# Patient Record
Sex: MALE | Race: WHITE | NOT HISPANIC OR LATINO | Employment: OTHER | URBAN - METROPOLITAN AREA
[De-identification: names, ages, dates, MRNs, and addresses within clinical notes are randomized per-mention and may not be internally consistent; named-entity substitution may affect disease eponyms.]

---

## 2018-10-26 PROBLEM — Z00.00 ENCOUNTER FOR WELLNESS EXAMINATION: Status: ACTIVE | Noted: 2018-10-26

## 2018-10-26 PROBLEM — R59.1 LYMPHADENOPATHY OF HEAD AND NECK: Status: ACTIVE | Noted: 2018-10-26

## 2019-01-28 PROBLEM — Z00.00 ENCOUNTER FOR WELLNESS EXAMINATION: Status: RESOLVED | Noted: 2018-10-26 | Resolved: 2019-01-28

## 2019-02-19 PROBLEM — C76.0 MALIGNANT NEOPLASM OF HEAD, NECK AND FACE: Status: ACTIVE | Noted: 2019-02-19

## 2019-02-20 PROBLEM — C32.0: Status: ACTIVE | Noted: 2019-02-19

## 2019-02-28 PROBLEM — Z71.9 ENCOUNTER FOR EDUCATION: Status: ACTIVE | Noted: 2019-02-28

## 2019-04-20 PROBLEM — D61.818 PANCYTOPENIA: Status: ACTIVE | Noted: 2019-04-20

## 2019-04-20 PROBLEM — R79.89 ELEVATED LFTS: Status: ACTIVE | Noted: 2019-04-20

## 2019-04-20 PROBLEM — A41.9 SEPSIS: Status: ACTIVE | Noted: 2019-04-20

## 2019-04-20 PROBLEM — E87.1 HYPONATREMIA: Status: ACTIVE | Noted: 2019-04-20

## 2019-04-22 PROBLEM — E87.1 HYPONATREMIA: Status: RESOLVED | Noted: 2019-04-20 | Resolved: 2019-04-22

## 2019-04-22 PROBLEM — A41.9 SEPSIS: Status: RESOLVED | Noted: 2019-04-20 | Resolved: 2019-04-22

## 2019-04-22 PROBLEM — R79.89 ELEVATED LFTS: Status: RESOLVED | Noted: 2019-04-20 | Resolved: 2019-04-22

## 2019-04-23 PROBLEM — J18.9 PNEUMONIA: Status: ACTIVE | Noted: 2019-04-23

## 2019-05-11 PROBLEM — R74.01 TRANSAMINITIS: Status: ACTIVE | Noted: 2019-05-11

## 2019-05-11 PROBLEM — N17.9 AKI (ACUTE KIDNEY INJURY): Status: ACTIVE | Noted: 2019-05-11

## 2019-05-11 PROBLEM — R11.2 INTRACTABLE VOMITING WITH NAUSEA: Status: ACTIVE | Noted: 2019-05-11

## 2019-05-11 PROBLEM — B37.0 THRUSH: Status: ACTIVE | Noted: 2019-05-11

## 2019-09-12 ENCOUNTER — TELEPHONE (OUTPATIENT)
Dept: SMOKING CESSATION | Facility: CLINIC | Age: 54
End: 2019-09-12

## 2019-09-16 ENCOUNTER — TELEPHONE (OUTPATIENT)
Dept: INTERVENTIONAL RADIOLOGY/VASCULAR | Facility: HOSPITAL | Age: 54
End: 2019-09-16

## 2019-09-17 ENCOUNTER — HOSPITAL ENCOUNTER (OUTPATIENT)
Facility: HOSPITAL | Age: 54
Discharge: HOME | End: 2019-09-17
Attending: RADIOLOGY | Admitting: RADIOLOGY
Payer: MEDICAID

## 2019-09-17 VITALS
TEMPERATURE: 98 F | BODY MASS INDEX: 22.76 KG/M2 | SYSTOLIC BLOOD PRESSURE: 116 MMHG | HEIGHT: 67 IN | HEART RATE: 68 BPM | WEIGHT: 145 LBS | OXYGEN SATURATION: 97 % | RESPIRATION RATE: 18 BRPM | DIASTOLIC BLOOD PRESSURE: 70 MMHG

## 2019-09-17 PROCEDURE — 88341 TISSUE SPECIMEN TO PATHOLOGY: ICD-10-PCS | Mod: 26,,, | Performed by: PATHOLOGY

## 2019-09-17 PROCEDURE — 88305 TISSUE SPECIMEN TO PATHOLOGY: ICD-10-PCS | Mod: 26,,, | Performed by: PATHOLOGY

## 2019-09-17 PROCEDURE — 88342 TISSUE SPECIMEN TO PATHOLOGY: ICD-10-PCS | Mod: 26,,, | Performed by: PATHOLOGY

## 2019-09-17 PROCEDURE — 88305 TISSUE EXAM BY PATHOLOGIST: CPT | Mod: 26,,, | Performed by: PATHOLOGY

## 2019-09-17 PROCEDURE — 88342 IMHCHEM/IMCYTCHM 1ST ANTB: CPT | Mod: 26,,, | Performed by: PATHOLOGY

## 2019-09-17 PROCEDURE — 88172 CYTP DX EVAL FNA 1ST EA SITE: CPT | Mod: 26,,, | Performed by: PATHOLOGY

## 2019-09-17 PROCEDURE — 63600175 PHARM REV CODE 636 W HCPCS: Performed by: RADIOLOGY

## 2019-09-17 PROCEDURE — 88305 TISSUE EXAM BY PATHOLOGIST: CPT | Performed by: PATHOLOGY

## 2019-09-17 PROCEDURE — 88172 TISSUE SPECIMEN TO PATHOLOGY: ICD-10-PCS | Mod: 26,,, | Performed by: PATHOLOGY

## 2019-09-17 PROCEDURE — 88341 IMHCHEM/IMCYTCHM EA ADD ANTB: CPT | Mod: 26,,, | Performed by: PATHOLOGY

## 2019-09-17 PROCEDURE — 88342 IMHCHEM/IMCYTCHM 1ST ANTB: CPT | Performed by: PATHOLOGY

## 2019-09-17 RX ORDER — HYDROMORPHONE HYDROCHLORIDE 2 MG/ML
INJECTION, SOLUTION INTRAMUSCULAR; INTRAVENOUS; SUBCUTANEOUS CODE/TRAUMA/SEDATION MEDICATION
Status: COMPLETED | OUTPATIENT
Start: 2019-09-17 | End: 2019-09-17

## 2019-09-17 RX ORDER — FENTANYL CITRATE 50 UG/ML
INJECTION, SOLUTION INTRAMUSCULAR; INTRAVENOUS CODE/TRAUMA/SEDATION MEDICATION
Status: COMPLETED | OUTPATIENT
Start: 2019-09-17 | End: 2019-09-17

## 2019-09-17 RX ORDER — SODIUM CHLORIDE 9 MG/ML
INJECTION, SOLUTION INTRAVENOUS
Status: COMPLETED | OUTPATIENT
Start: 2019-09-17 | End: 2019-09-17

## 2019-09-17 RX ORDER — MIDAZOLAM HYDROCHLORIDE 1 MG/ML
INJECTION INTRAMUSCULAR; INTRAVENOUS CODE/TRAUMA/SEDATION MEDICATION
Status: COMPLETED | OUTPATIENT
Start: 2019-09-17 | End: 2019-09-17

## 2019-09-17 RX ADMIN — SODIUM CHLORIDE 75 ML/HR: 0.9 INJECTION, SOLUTION INTRAVENOUS at 10:09

## 2019-09-17 RX ADMIN — MIDAZOLAM HYDROCHLORIDE 1 MG: 1 INJECTION, SOLUTION INTRAMUSCULAR; INTRAVENOUS at 10:09

## 2019-09-17 RX ADMIN — FENTANYL CITRATE 50 MCG: 50 INJECTION, SOLUTION INTRAMUSCULAR; INTRAVENOUS at 10:09

## 2019-09-17 RX ADMIN — HYDROMORPHONE HYDROCHLORIDE 1 MG: 2 INJECTION INTRAMUSCULAR; INTRAVENOUS; SUBCUTANEOUS at 10:09

## 2019-09-17 NOTE — H&P
Radiology History & Physical      SUBJECTIVE:     Chief Complaint: hypermetabolic bone lesion    History of Present Illness:  Roshan Wheeler Sr. is a 54 y.o. male who presents for biopsy of right iliac bone lesion.  Past Medical History:   Diagnosis Date    Broken bones     Cancer      Past Surgical History:   Procedure Laterality Date    FRACTURE SURGERY Right 2000    ankle fx     ROTATOR CUFF REPAIR Right 2010       Home Meds:   Prior to Admission medications    Medication Sig Start Date End Date Taking? Authorizing Provider   esomeprazole (NEXIUM) 40 MG capsule Take one capsule(40mg) by mouth daily 3/7/19  Yes Historical Provider, MD   ibuprofen (ADVIL,MOTRIN) 200 MG tablet Take 200 mg by mouth every 6 (six) hours as needed for Pain.   Yes Historical Provider, MD   ondansetron (ZOFRAN) 8 MG tablet Take 8 mg by mouth every 8 (eight) hours as needed for Nausea.   Yes Historical Provider, MD     Anticoagulants/Antiplatelets: no anticoagulation    Allergies: Review of patient's allergies indicates:  No Known Allergies  Sedation History:  no adverse reactions    Review of Systems:   Hematological: no known coagulopathies  Respiratory: no shortness of breath  Cardiovascular: no chest pain  Gastrointestinal: no abdominal pain  Genito-Urinary: no dysuria  Musculoskeletal: negative  Neurological: no TIA or stroke symptoms         OBJECTIVE:     Vital Signs (Most Recent)  Temp: 97.6 °F (36.4 °C) (09/17/19 0919)  Pulse: 68 (09/17/19 1140)  Resp: 18 (09/17/19 1140)  BP: 116/70 (09/17/19 1140)  SpO2: 97 % (09/17/19 1140)    Physical Exam:  ASA: 2  Mallampati: 2    General: no acute distress  Mental Status: alert and oriented to person, place and time  HEENT: normocephalic, atraumatic  Chest: unlabored breathing  Heart: regular heart rate  Abdomen: nondistended  Extremity: moves all extremities    Laboratory  Lab Results   Component Value Date    INR 1.3 (H) 05/11/2019       Lab Results   Component Value Date    WBC  3.32 (L) 08/28/2019    HGB 12.7 (L) 08/28/2019    HCT 40.0 08/28/2019    MCV 93 08/28/2019     (L) 08/28/2019      Lab Results   Component Value Date    GLU 89 08/28/2019     08/28/2019    K 4.7 08/28/2019     08/28/2019    CO2 30 (H) 08/28/2019    BUN 14 08/28/2019    CREATININE 1.1 08/28/2019    CALCIUM 10.6 (H) 08/28/2019    MG 1.9 05/13/2019    ALT 39 08/28/2019    AST 44 (H) 08/28/2019    ALBUMIN 3.9 08/28/2019    BILITOT 0.3 08/28/2019    BILIDIR 5.0 (H) 04/21/2019       ASSESSMENT/PLAN:     Sedation Plan: Moderate  Patient will undergo right iliac bone lesion biopsy.    Huy Corrales M.D.  Diagnostic and Interventional Radiologist  Department of Radiology  Pager: [phone]

## 2019-09-17 NOTE — PROCEDURES
Radiology Post-Procedure Note    Pre Op Diagnosis: bone lesion    Post Op Diagnosis: Same    Procedure: Bone lesion biopsy    Procedure performed by: Huy Corrales MD    Written Informed Consent Obtained: Yes  Specimen Removed: YES 6 x 18 gauge cores  Estimated Blood Loss: Minimal    Findings:   Under CT guidance, a 17/18 gauge biopsy system was used to obtain 6 cores from right iliac bone lesion. No complications.    Patient tolerated procedure well.    Huy Corrales M.D.  Diagnostic and Interventional Radiologist  Department of Radiology  Pager: [phone]

## 2019-09-17 NOTE — DISCHARGE SUMMARY
Radiology Discharge Summary      Hospital Course: No complications    Admit Date: 9/17/2019  Discharge Date: 09/17/2019     Instructions Given to Patient: Yes  Diet: Resume prior diet  Activity: activity as tolerated and no driving for today    Description of Condition on Discharge: Stable  Vital Signs (Most Recent): Temp: 97.6 °F (36.4 °C) (09/17/19 0919)  Pulse: 68 (09/17/19 1140)  Resp: 18 (09/17/19 1140)  BP: 116/70 (09/17/19 1140)  SpO2: 97 % (09/17/19 1140)    Discharge Disposition: Home    Discharge Diagnosis: Right iliac bone lesion s/p biopsy. Follow up as scheduled.    Huy Corrales M.D.  Diagnostic and Interventional Radiologist  Department of Radiology  Pager: [phone]     No complaints

## 2019-09-24 ENCOUNTER — TELEPHONE (OUTPATIENT)
Dept: SMOKING CESSATION | Facility: CLINIC | Age: 54
End: 2019-09-24

## 2019-09-30 PROBLEM — C76.0 HEAD AND NECK CANCER: Status: ACTIVE | Noted: 2019-09-30

## 2019-10-07 PROBLEM — C44.92 SCC (SQUAMOUS CELL CARCINOMA): Status: ACTIVE | Noted: 2019-10-07

## 2019-10-21 PROBLEM — Z95.828 PORT-A-CATH IN PLACE: Status: ACTIVE | Noted: 2019-10-21

## 2019-10-24 ENCOUNTER — TELEPHONE (OUTPATIENT)
Dept: SMOKING CESSATION | Facility: CLINIC | Age: 54
End: 2019-10-24

## 2019-11-11 PROBLEM — Z92.3 HISTORY OF RADIATION THERAPY: Chronic | Status: ACTIVE | Noted: 2019-11-11

## 2019-11-11 PROBLEM — N52.1 ERECTILE DYSFUNCTION DUE TO DISEASES CLASSIFIED ELSEWHERE: Status: ACTIVE | Noted: 2019-11-11

## 2019-11-11 PROBLEM — Z78.9 1 MINUTE APGAR SCORE 0: Status: ACTIVE | Noted: 2019-11-11

## 2019-11-14 ENCOUNTER — TELEPHONE (OUTPATIENT)
Dept: SMOKING CESSATION | Facility: CLINIC | Age: 54
End: 2019-11-14

## 2019-11-14 NOTE — TELEPHONE ENCOUNTER
Contacted pt to reschedule missed intake appt; pt not interested in rescheduling at this time, will call when ready

## 2019-11-25 PROBLEM — C79.51 BONE METASTASIS: Status: ACTIVE | Noted: 2019-11-25

## 2020-02-12 PROBLEM — R13.12 OROPHARYNGEAL DYSPHAGIA: Status: ACTIVE | Noted: 2020-02-12

## 2020-02-18 PROBLEM — E03.8 SUBCLINICAL HYPOTHYROIDISM: Status: ACTIVE | Noted: 2020-02-18

## 2020-02-18 PROBLEM — Z91.199 CURRENT NONADHERENCE TO MEDICAL TREATMENT: Status: ACTIVE | Noted: 2020-02-18

## 2020-02-18 PROBLEM — Z72.0 TOBACCO ABUSE: Status: ACTIVE | Noted: 2020-02-18

## 2020-05-11 PROBLEM — K74.60 HEPATIC CIRRHOSIS DUE TO CHRONIC HEPATITIS C INFECTION: Status: ACTIVE | Noted: 2020-05-11

## 2020-05-11 PROBLEM — B18.2 HEPATIC CIRRHOSIS DUE TO CHRONIC HEPATITIS C INFECTION: Status: ACTIVE | Noted: 2020-05-11

## 2020-05-13 PROBLEM — E03.8 SUBCLINICAL HYPOTHYROIDISM: Status: RESOLVED | Noted: 2020-02-18 | Resolved: 2020-05-13

## 2020-11-24 PROBLEM — E03.2 HYPOTHYROIDISM DUE TO MEDICAMENTS AND OTHER EXOGENOUS SUBSTANCES: Status: ACTIVE | Noted: 2020-11-24

## 2021-05-04 ENCOUNTER — PATIENT MESSAGE (OUTPATIENT)
Dept: RESEARCH | Facility: HOSPITAL | Age: 56
End: 2021-05-04

## 2021-05-10 ENCOUNTER — PATIENT MESSAGE (OUTPATIENT)
Dept: RESEARCH | Facility: HOSPITAL | Age: 56
End: 2021-05-10